# Patient Record
Sex: MALE | Race: WHITE | NOT HISPANIC OR LATINO
[De-identification: names, ages, dates, MRNs, and addresses within clinical notes are randomized per-mention and may not be internally consistent; named-entity substitution may affect disease eponyms.]

---

## 2019-12-01 ENCOUNTER — TRANSCRIPTION ENCOUNTER (OUTPATIENT)
Age: 29
End: 2019-12-01

## 2019-12-18 ENCOUNTER — TRANSCRIPTION ENCOUNTER (OUTPATIENT)
Age: 29
End: 2019-12-18

## 2021-04-30 PROBLEM — Z00.00 ENCOUNTER FOR PREVENTIVE HEALTH EXAMINATION: Status: ACTIVE | Noted: 2021-04-30

## 2021-05-04 ENCOUNTER — APPOINTMENT (OUTPATIENT)
Dept: UROLOGY | Facility: CLINIC | Age: 31
End: 2021-05-04
Payer: COMMERCIAL

## 2021-05-04 VITALS
HEART RATE: 65 BPM | SYSTOLIC BLOOD PRESSURE: 136 MMHG | HEIGHT: 73 IN | DIASTOLIC BLOOD PRESSURE: 82 MMHG | TEMPERATURE: 97.5 F

## 2021-05-04 DIAGNOSIS — R35.0 FREQUENCY OF MICTURITION: ICD-10-CM

## 2021-05-04 DIAGNOSIS — R36.9 URETHRAL DISCHARGE, UNSPECIFIED: ICD-10-CM

## 2021-05-04 LAB
BILIRUB UR QL STRIP: NORMAL
CLARITY UR: CLEAR
COLLECTION METHOD: NORMAL
GLUCOSE UR-MCNC: NORMAL
HCG UR QL: 0.2 EU/DL
HGB UR QL STRIP.AUTO: NORMAL
KETONES UR-MCNC: NORMAL
LEUKOCYTE ESTERASE UR QL STRIP: NORMAL
NITRITE UR QL STRIP: NORMAL
PH UR STRIP: 6
PROT UR STRIP-MCNC: NORMAL
SP GR UR STRIP: 1.02

## 2021-05-04 PROCEDURE — 51798 US URINE CAPACITY MEASURE: CPT

## 2021-05-04 PROCEDURE — 99204 OFFICE O/P NEW MOD 45 MIN: CPT

## 2021-05-04 PROCEDURE — 99072 ADDL SUPL MATRL&STAF TM PHE: CPT

## 2021-05-04 NOTE — ASSESSMENT
[FreeTextEntry1] : Pt is a 30 year old male who presents with complaints of increased urinary frequency that began 3 months ago and penile discharge that began 3 weeks ago. Will send urine for culture, Mycoplasma/Ureaplasma culture, and Chlamydia/GC testing. If tests negative, he will return for cystoscopy. \par \par Patient expressed understanding. \par \par PVR: 0 cc (to rule out incomplete bladder emptying sensation)

## 2021-05-04 NOTE — LETTER BODY
[Dear  ___] : Dear  [unfilled], [Consult Letter:] : I had the pleasure of evaluating your patient, [unfilled]. [Please see my note below.] : Please see my note below. [Consult Closing:] : Thank you very much for allowing me to participate in the care of this patient.  If you have any questions, please do not hesitate to contact me. [Sincerely,] : Sincerely, [FreeTextEntry3] : Dr. Casie Morejon

## 2021-05-04 NOTE — PHYSICAL EXAM
[General Appearance - Well Developed] : well developed [General Appearance - Well Nourished] : well nourished [Normal Appearance] : normal appearance [Well Groomed] : well groomed [General Appearance - In No Acute Distress] : no acute distress [Edema] : no peripheral edema [Exaggerated Use Of Accessory Muscles For Inspiration] : no accessory muscle use [Urethral Meatus] : meatus normal [Urinary Bladder Findings] : the bladder was normal on palpation [Scrotum] : the scrotum was normal [Testes Mass (___cm)] : there were no testicular masses [No Prostate Nodules] : no prostate nodules [Normal Station and Gait] : the gait and station were normal for the patient's age [] : no rash [No Focal Deficits] : no focal deficits [Oriented To Time, Place, And Person] : oriented to person, place, and time [Affect] : the affect was normal [Mood] : the mood was normal [Not Anxious] : not anxious [Penis Abnormality] : normal circumcised penis [FreeTextEntry1] : nontender prostate, no testicular masses, normal exam

## 2021-05-04 NOTE — HISTORY OF PRESENT ILLNESS
[FreeTextEntry1] : Pt is a 30 year old male who presents with complaints of increased urinary frequency that began 3 months ago and penile discharge that began 3 weeks ago. He initially presented to his PCP and states STD testing, which included genital swab, blood work, and urine cultures, was negative. Denies abx at this time and states that discharge has since cleared. Today he continues to report increased urinary frequency, urgency, and nocturia (x1). Reports mild dysuria, specifies that urine "feels hot." He reports weaker urinary stream and states that there is more "dribbling" at end of void, but denies intermittent stream. Denies incomplete bladder emptying, gross hematuria, dysuria, back pain, fever, or chills. He reports hx of intermittent sharp suprapubic pain one year ago but denies similar pain today. \par \par Udip: negative\par \par Sexually active with 1 long term partner.\par PMH: none\par SH: none \par FH: prostate cancer (maternal grandfather), \par Social History: never smoker, social EtOH\par

## 2021-05-05 ENCOUNTER — NON-APPOINTMENT (OUTPATIENT)
Age: 31
End: 2021-05-05

## 2021-05-05 LAB
C TRACH RRNA SPEC QL NAA+PROBE: NOT DETECTED
N GONORRHOEA RRNA SPEC QL NAA+PROBE: NOT DETECTED
SOURCE AMPLIFICATION: NORMAL

## 2021-05-06 ENCOUNTER — NON-APPOINTMENT (OUTPATIENT)
Age: 31
End: 2021-05-06

## 2021-05-06 LAB — BACTERIA UR CULT: NORMAL

## 2021-05-11 ENCOUNTER — NON-APPOINTMENT (OUTPATIENT)
Age: 31
End: 2021-05-11

## 2021-05-11 LAB
MYCOPLASMA HOMINIS CULTURE: NEGATIVE
UREAPLASMA CULTURE: NEGATIVE

## 2021-05-18 ENCOUNTER — NON-APPOINTMENT (OUTPATIENT)
Age: 31
End: 2021-05-18

## 2022-04-26 ENCOUNTER — OUTPATIENT (OUTPATIENT)
Dept: OUTPATIENT SERVICES | Facility: HOSPITAL | Age: 32
LOS: 1 days | End: 2022-04-26
Payer: COMMERCIAL

## 2022-04-26 ENCOUNTER — APPOINTMENT (OUTPATIENT)
Dept: ULTRASOUND IMAGING | Facility: HOSPITAL | Age: 32
End: 2022-04-26

## 2022-04-26 PROCEDURE — 93975 VASCULAR STUDY: CPT | Mod: 26

## 2022-04-26 PROCEDURE — 76870 US EXAM SCROTUM: CPT

## 2022-04-26 PROCEDURE — 93975 VASCULAR STUDY: CPT

## 2022-04-26 PROCEDURE — 76870 US EXAM SCROTUM: CPT | Mod: 26

## 2022-05-25 ENCOUNTER — APPOINTMENT (OUTPATIENT)
Dept: UROLOGY | Facility: CLINIC | Age: 32
End: 2022-05-25
Payer: COMMERCIAL

## 2022-05-25 VITALS
DIASTOLIC BLOOD PRESSURE: 77 MMHG | OXYGEN SATURATION: 95 % | HEART RATE: 92 BPM | TEMPERATURE: 98.7 F | SYSTOLIC BLOOD PRESSURE: 123 MMHG

## 2022-05-25 PROCEDURE — 81003 URINALYSIS AUTO W/O SCOPE: CPT | Mod: QW

## 2022-05-25 PROCEDURE — 99213 OFFICE O/P EST LOW 20 MIN: CPT

## 2022-05-26 LAB
BILIRUB UR QL STRIP: NORMAL
COLLECTION METHOD: NORMAL
GLUCOSE UR-MCNC: NORMAL
HCG UR QL: 1 EU/DL
HGB UR QL STRIP.AUTO: NORMAL
KETONES UR-MCNC: NORMAL
LEUKOCYTE ESTERASE UR QL STRIP: NORMAL
NITRITE UR QL STRIP: NORMAL
PH UR STRIP: 5.5
PROT UR STRIP-MCNC: NORMAL
SP GR UR STRIP: 1.03

## 2022-06-01 NOTE — PHYSICAL EXAM
[General Appearance - Well Developed] : well developed [General Appearance - Well Nourished] : well nourished [Normal Appearance] : normal appearance [Well Groomed] : well groomed [General Appearance - In No Acute Distress] : no acute distress [Urethral Meatus] : meatus normal [Penis Abnormality] : normal circumcised penis [Urinary Bladder Findings] : the bladder was normal on palpation [Scrotum] : the scrotum was normal [Testes Mass (___cm)] : there were no testicular masses [No Prostate Nodules] : no prostate nodules [FreeTextEntry1] : nontender prostate, right epididymal head cyst 2-3cm, non-tender, no fluctuance.  [Edema] : no peripheral edema [] : no respiratory distress [Exaggerated Use Of Accessory Muscles For Inspiration] : no accessory muscle use [Oriented To Time, Place, And Person] : oriented to person, place, and time [Affect] : the affect was normal [Mood] : the mood was normal [Not Anxious] : not anxious [Normal Station and Gait] : the gait and station were normal for the patient's age [No Focal Deficits] : no focal deficits

## 2022-06-01 NOTE — HISTORY OF PRESENT ILLNESS
[FreeTextEntry1] : Pt is a 30 year old male who presents with complaints of right testicular cyst.  He recently presented to an urgent care with lower abdominal pain and was treated for presumed STD with PenV, amox and doxy.  He was then found to have a multiloculated epididymal right testicular cyst on US and was told to follow up with urology.   Non-tender, no fever or chills.\par \par Udip: negative\par \par Sexually active with 1 long term partner.\par PMH: none\par SH: none \par FH: prostate cancer (maternal grandfather), \par Social History: never smoker, social EtOH\par

## 2022-06-01 NOTE — ASSESSMENT
[FreeTextEntry1] : Pt is a 30 year old male who presents with right epididymal head cyst.  We discussed the benign nature of this cyst and we will manage it conservatively for now as it is not causing significant bother.   Urine was sent for culture.  \par \par Patient expressed understanding. \par \par PVR: 0 cc (to rule out incomplete bladder emptying sensation)

## 2022-09-27 ENCOUNTER — APPOINTMENT (OUTPATIENT)
Dept: UROLOGY | Facility: CLINIC | Age: 32
End: 2022-09-27

## 2022-09-27 VITALS
DIASTOLIC BLOOD PRESSURE: 69 MMHG | SYSTOLIC BLOOD PRESSURE: 111 MMHG | TEMPERATURE: 97.8 F | OXYGEN SATURATION: 96 % | HEART RATE: 74 BPM

## 2022-09-27 PROCEDURE — 99214 OFFICE O/P EST MOD 30 MIN: CPT

## 2022-09-29 NOTE — HISTORY OF PRESENT ILLNESS
[FreeTextEntry1] : Pt is a 31 yo M with a right epididymal head cyst who returns today for a follow-up. He feels cyst has grown, and reports experiencing cramping on his R side in pelvic area - right groin symptoms worse after the gym.   He also states that R scrotum more bothersome when sexually active/masturbating. He denies urinary symptoms.  Denies any GI symptoms.  No fever/chills. \par \par Udip: (+) trace ketones\par \par 5/25/22-- Pt is a 30 year old male who presents with complaints of right testicular cyst.  He recently presented to an urgent care with lower abdominal pain and was treated for presumed STD with PenV, amox and doxy.  He was then found to have a multiloculated epididymal right testicular cyst on US and was told to follow up with urology.   Non-tender, no fever or chills.\par \par Udip: negative\par \par Sexually active with 1 long term partner.\par PMH: none\par SH: none \par FH: prostate cancer (maternal grandfather), \par Social History: never smoker, social EtOH\par

## 2022-09-29 NOTE — ASSESSMENT
[FreeTextEntry1] : Pt is a 31 yo M with a right epididymal head cyst who returns today for follow-up. He feels the testicular cyst has increased in size,  and is more aware of it.  He is mostly bothered by his right groin symptoms, however.  On examination, he has a right inguinal hernia.  We discussed this at length and I provided him with the name of a general surgeon colleague. He will address this first as this is responsible for his most bothersome complaints.  We will hold on addressing the right epididymal cyst for now.  He will keep me updated after he meets with General Surgery. \par \par Patient expressed understanding.\par \par

## 2022-09-29 NOTE — ADDENDUM
[FreeTextEntry1] : A portion of this note was written by [Arabella Lechuga] on 09/27/2022  acting as a scribe for Dr. Morejon. \par \par I have personally reviewed the chart and agree that the record accurately reflects my personal performance and the history, physical exam, assessment, and plan.\par

## 2022-09-29 NOTE — PHYSICAL EXAM
[General Appearance - Well Developed] : well developed [General Appearance - Well Nourished] : well nourished [Normal Appearance] : normal appearance [Well Groomed] : well groomed [General Appearance - In No Acute Distress] : no acute distress [Urethral Meatus] : meatus normal [Penis Abnormality] : normal circumcised penis [Urinary Bladder Findings] : the bladder was normal on palpation [Scrotum] : the scrotum was normal [Testes Mass (___cm)] : there were no testicular masses [No Prostate Nodules] : no prostate nodules [Edema] : no peripheral edema [] : no respiratory distress [Exaggerated Use Of Accessory Muscles For Inspiration] : no accessory muscle use [Oriented To Time, Place, And Person] : oriented to person, place, and time [Affect] : the affect was normal [Mood] : the mood was normal [Not Anxious] : not anxious [Normal Station and Gait] : the gait and station were normal for the patient's age [No Focal Deficits] : no focal deficits [FreeTextEntry1] : right epididymal head cyst 2-3cm, + right inguinal hernia